# Patient Record
Sex: MALE | Race: WHITE | NOT HISPANIC OR LATINO | Employment: FULL TIME | URBAN - METROPOLITAN AREA
[De-identification: names, ages, dates, MRNs, and addresses within clinical notes are randomized per-mention and may not be internally consistent; named-entity substitution may affect disease eponyms.]

---

## 2017-02-17 ENCOUNTER — HOSPITAL ENCOUNTER (OUTPATIENT)
Dept: CARDIOLOGY | Facility: HOSPITAL | Age: 62
Discharge: HOME | End: 2017-02-17
Attending: SPECIALIST | Admitting: SPECIALIST

## 2017-02-17 ENCOUNTER — TRANSCRIBE ORDERS (OUTPATIENT)
Dept: LAB | Facility: HOSPITAL | Age: 62
End: 2017-02-17

## 2017-02-17 ENCOUNTER — HOSPITAL ENCOUNTER (OUTPATIENT)
Dept: GENERAL RADIOLOGY | Facility: HOSPITAL | Age: 62
Discharge: HOME | End: 2017-02-17
Attending: SPECIALIST

## 2017-02-17 DIAGNOSIS — I10 ESSENTIAL HYPERTENSION: Primary | ICD-10-CM

## 2017-02-17 DIAGNOSIS — R05.9 COUGH: ICD-10-CM

## 2017-02-17 DIAGNOSIS — I10 ESSENTIAL HYPERTENSION: ICD-10-CM

## 2017-02-17 PROCEDURE — 93005 ELECTROCARDIOGRAM TRACING: CPT | Performed by: SPECIALIST

## 2017-02-17 PROCEDURE — 71020 HC CHEST PA AND LATERAL: CPT

## 2017-02-17 PROCEDURE — 93010 ELECTROCARDIOGRAM REPORT: CPT | Performed by: INTERNAL MEDICINE

## 2017-03-06 ENCOUNTER — OFFICE VISIT (OUTPATIENT)
Dept: CARDIOLOGY | Facility: CLINIC | Age: 62
End: 2017-03-06

## 2017-03-06 VITALS
HEART RATE: 76 BPM | SYSTOLIC BLOOD PRESSURE: 170 MMHG | BODY MASS INDEX: 29.62 KG/M2 | WEIGHT: 200 LBS | HEIGHT: 69 IN | DIASTOLIC BLOOD PRESSURE: 82 MMHG

## 2017-03-06 DIAGNOSIS — I73.9 CLAUDICATION (HCC): ICD-10-CM

## 2017-03-06 DIAGNOSIS — I73.9 PAD (PERIPHERAL ARTERY DISEASE) (HCC): Primary | ICD-10-CM

## 2017-03-06 PROCEDURE — 99204 OFFICE O/P NEW MOD 45 MIN: CPT | Performed by: INTERNAL MEDICINE

## 2017-03-06 RX ORDER — AMLODIPINE BESYLATE AND BENAZEPRIL HYDROCHLORIDE 5; 10 MG/1; MG/1
1 CAPSULE ORAL 2 TIMES DAILY
Status: ON HOLD | COMMUNITY
End: 2017-04-25

## 2017-03-06 RX ORDER — GABAPENTIN 300 MG/1
300 CAPSULE ORAL 2 TIMES DAILY
COMMUNITY
End: 2017-05-24 | Stop reason: SDUPTHER

## 2017-03-06 RX ORDER — ROSUVASTATIN CALCIUM 5 MG/1
5 TABLET, COATED ORAL DAILY
COMMUNITY

## 2017-03-06 RX ORDER — PENTOXIFYLLINE 400 MG/1
400 TABLET, EXTENDED RELEASE ORAL
COMMUNITY
End: 2017-03-21

## 2017-03-09 PROCEDURE — 93000 ELECTROCARDIOGRAM COMPLETE: CPT | Performed by: INTERNAL MEDICINE

## 2017-03-09 NOTE — PROGRESS NOTES
Subjective:     Encounter Date:03/06/2017      Patient ID: Loyd Vasquez is a 61 y.o. male.    Chief Complaint: PAD, claudication    History of Present Illness     Dear Dr. Jimenez,     I had the pleasure of seeing your patient Loyd Vasquez in cardiac evaluation today.  As you well know, he is a jw 61-year-old man with history of smoking.  He has hypertension and treated hyperlipidemia.  He comes to me with complaints of exertional claudication.  He says that when he walks, he gets pain in his left calf.  It is lifestyle limiting now and he is interested in improving his circulation.  He has cool lower extremities bilaterally.      His EKG demonstrates an incomplete left bundle branch block but he has no complaints of angina.  He denies any symptoms of critical limb ischemia including ulceration or infection.          Review of Systems   All other systems reviewed and are negative.    History reviewed. No pertinent family history.    Social History   Substance Use Topics   • Smoking status: Current Every Day Smoker     Packs/day: 1.00     Years: 40.00   • Smokeless tobacco: None   • Alcohol use No         ECG 12 Lead  Date/Time: 3/9/2017 11:58 AM  Performed by: BELTRAN SHAHID  Authorized by: BELTRAN SHAHID   Comparison: compared with previous ECG   Similar to previous ECG  Rhythm: sinus rhythm  BPM: 76  Conduction: incomplete LBBB               Objective:     Physical Exam   Constitutional: He is oriented to person, place, and time. He appears well-developed and well-nourished.   HENT:   Head: Normocephalic and atraumatic.   Neck: Normal range of motion. Neck supple.   Cardiovascular: Normal rate, regular rhythm and normal heart sounds.    Reduced pedal pulses bilaterally with cool feet   Pulmonary/Chest: Effort normal and breath sounds normal.   Abdominal: Soft. Bowel sounds are normal.   Musculoskeletal: Normal range of motion.   Neurological: He is alert and oriented to person, place, and time.   Skin: Skin is warm  and dry.   Psychiatric: He has a normal mood and affect. His behavior is normal. Thought content normal.   Vitals reviewed.      Lab Review:       Assessment:          Diagnosis Plan   1. PAD (peripheral artery disease)  Case Request Cath Lab: Abdominal Aortagram with Runoff - L LE claudication   2. Claudication  Case Request Cath Lab: Abdominal Aortagram with Runoff - L LE claudication          Plan:        It was a pleasure to see your patient in cardiac evaluation today.  He is a jw 61-year-old man with multiple risk factors for coronary and peripheral arterial disease.  He denies any symptoms of angina although he has symptoms of severe lifestyle limiting claudication.  Peripheral examination suggests significant peripheral arterial disease, likely SFA in location.  He would like to have this treated.  I recommended that we proceed with lower extremity angiography and possible percutaneous intervention of his left leg.  He will schedule this at his earliest convenience.

## 2017-03-13 ENCOUNTER — LAB (OUTPATIENT)
Dept: LAB | Facility: HOSPITAL | Age: 62
End: 2017-03-13
Attending: INTERNAL MEDICINE

## 2017-03-13 ENCOUNTER — TRANSCRIBE ORDERS (OUTPATIENT)
Dept: CARDIOLOGY | Facility: CLINIC | Age: 62
End: 2017-03-13

## 2017-03-13 DIAGNOSIS — Z13.6 SCREENING FOR ISCHEMIC HEART DISEASE: ICD-10-CM

## 2017-03-13 DIAGNOSIS — Z01.810 PRE-OPERATIVE CARDIOVASCULAR EXAMINATION: Primary | ICD-10-CM

## 2017-03-13 DIAGNOSIS — Z01.810 PRE-OPERATIVE CARDIOVASCULAR EXAMINATION: ICD-10-CM

## 2017-03-13 LAB
ANION GAP SERPL CALCULATED.3IONS-SCNC: 12.9 MMOL/L
BASOPHILS # BLD AUTO: 0.02 10*3/MM3 (ref 0–0.2)
BASOPHILS NFR BLD AUTO: 0.3 % (ref 0–1.5)
BUN BLD-MCNC: 17 MG/DL (ref 6–20)
BUN/CREAT SERPL: 14 (ref 7–25)
CALCIUM SPEC-SCNC: 9.4 MG/DL (ref 8.6–10.5)
CHLORIDE SERPL-SCNC: 102 MMOL/L (ref 98–107)
CO2 SERPL-SCNC: 25.1 MMOL/L (ref 22–29)
CREAT BLD-MCNC: 1.21 MG/DL (ref 0.76–1.27)
DEPRECATED RDW RBC AUTO: 41 FL (ref 37–54)
EOSINOPHIL # BLD AUTO: 0.11 10*3/MM3 (ref 0–0.7)
EOSINOPHIL NFR BLD AUTO: 1.4 % (ref 0.3–6.2)
ERYTHROCYTE [DISTWIDTH] IN BLOOD BY AUTOMATED COUNT: 12.7 % (ref 11.5–14.5)
GFR SERPL CREATININE-BSD FRML MDRD: 61 ML/MIN/1.73
GLUCOSE BLD-MCNC: 111 MG/DL (ref 65–99)
HCT VFR BLD AUTO: 40.9 % (ref 40.4–52.2)
HGB BLD-MCNC: 14.2 G/DL (ref 13.7–17.6)
IMM GRANULOCYTES # BLD: 0 10*3/MM3 (ref 0–0.03)
IMM GRANULOCYTES NFR BLD: 0 % (ref 0–0.5)
LYMPHOCYTES # BLD AUTO: 3.35 10*3/MM3 (ref 0.9–4.8)
LYMPHOCYTES NFR BLD AUTO: 44.1 % (ref 19.6–45.3)
MCH RBC QN AUTO: 31.1 PG (ref 27–32.7)
MCHC RBC AUTO-ENTMCNC: 34.7 G/DL (ref 32.6–36.4)
MCV RBC AUTO: 89.7 FL (ref 79.8–96.2)
MONOCYTES # BLD AUTO: 0.69 10*3/MM3 (ref 0.2–1.2)
MONOCYTES NFR BLD AUTO: 9.1 % (ref 5–12)
NEUTROPHILS # BLD AUTO: 3.42 10*3/MM3 (ref 1.9–8.1)
NEUTROPHILS NFR BLD AUTO: 45.1 % (ref 42.7–76)
PLATELET # BLD AUTO: 220 10*3/MM3 (ref 140–500)
PMV BLD AUTO: 9.7 FL (ref 6–12)
POTASSIUM BLD-SCNC: 4.2 MMOL/L (ref 3.5–5.2)
RBC # BLD AUTO: 4.56 10*6/MM3 (ref 4.6–6)
SODIUM BLD-SCNC: 140 MMOL/L (ref 136–145)
WBC NRBC COR # BLD: 7.59 10*3/MM3 (ref 4.5–10.7)

## 2017-03-14 ENCOUNTER — HOSPITAL ENCOUNTER (OUTPATIENT)
Facility: HOSPITAL | Age: 62
Setting detail: HOSPITAL OUTPATIENT SURGERY
Discharge: HOME | End: 2017-03-14
Attending: INTERNAL MEDICINE | Admitting: INTERNAL MEDICINE

## 2017-03-14 VITALS
WEIGHT: 200 LBS | RESPIRATION RATE: 16 BRPM | HEART RATE: 82 BPM | HEIGHT: 69 IN | BODY MASS INDEX: 29.62 KG/M2 | SYSTOLIC BLOOD PRESSURE: 146 MMHG | OXYGEN SATURATION: 96 % | DIASTOLIC BLOOD PRESSURE: 79 MMHG | TEMPERATURE: 97.6 F

## 2017-03-14 DIAGNOSIS — I73.9 PAD (PERIPHERAL ARTERY DISEASE) (HCC): ICD-10-CM

## 2017-03-14 DIAGNOSIS — I73.9 CLAUDICATION (HCC): ICD-10-CM

## 2017-03-14 PROCEDURE — 25010000002 FENTANYL CITRATE (PF) 100 MCG/2ML SOLUTION: Performed by: INTERNAL MEDICINE

## 2017-03-14 PROCEDURE — C1894 INTRO/SHEATH, NON-LASER: HCPCS | Performed by: INTERNAL MEDICINE

## 2017-03-14 PROCEDURE — 99152 MOD SED SAME PHYS/QHP 5/>YRS: CPT | Performed by: INTERNAL MEDICINE

## 2017-03-14 PROCEDURE — C1769 GUIDE WIRE: HCPCS | Performed by: INTERNAL MEDICINE

## 2017-03-14 PROCEDURE — C1887 CATHETER, GUIDING: HCPCS | Performed by: INTERNAL MEDICINE

## 2017-03-14 PROCEDURE — 75625 CONTRAST EXAM ABDOMINL AORTA: CPT | Performed by: INTERNAL MEDICINE

## 2017-03-14 PROCEDURE — 37226 PR REVSC OPN/PRQ FEM/POP W/STNT/ANGIOP SM VSL: CPT | Performed by: INTERNAL MEDICINE

## 2017-03-14 PROCEDURE — 25010000002 MIDAZOLAM PER 1 MG: Performed by: INTERNAL MEDICINE

## 2017-03-14 PROCEDURE — 25010000002 HEPARIN (PORCINE) PER 1000 UNITS: Performed by: INTERNAL MEDICINE

## 2017-03-14 PROCEDURE — 37221 PR REVSC OPN/PRQ ILIAC ART W/STNT PLMT & ANGIOPLSTY: CPT | Performed by: INTERNAL MEDICINE

## 2017-03-14 PROCEDURE — 85347 COAGULATION TIME ACTIVATED: CPT

## 2017-03-14 PROCEDURE — C1725 CATH, TRANSLUMIN NON-LASER: HCPCS | Performed by: INTERNAL MEDICINE

## 2017-03-14 PROCEDURE — 99153 MOD SED SAME PHYS/QHP EA: CPT | Performed by: INTERNAL MEDICINE

## 2017-03-14 PROCEDURE — C1876 STENT, NON-COA/NON-COV W/DEL: HCPCS | Performed by: INTERNAL MEDICINE

## 2017-03-14 PROCEDURE — 75716 ARTERY X-RAYS ARMS/LEGS: CPT | Performed by: INTERNAL MEDICINE

## 2017-03-14 PROCEDURE — 0 IOPAMIDOL PER 1 ML: Performed by: INTERNAL MEDICINE

## 2017-03-14 DEVICE — IMPLANTABLE DEVICE: Type: IMPLANTABLE DEVICE | Status: FUNCTIONAL

## 2017-03-14 DEVICE — IMPLANTABLE DEVICE
Type: IMPLANTABLE DEVICE | Status: FUNCTIONAL
Brand: CORDIS S.M.A.R.T. CONTROL VASCULAR STENT SYSTEM

## 2017-03-14 RX ORDER — CLOPIDOGREL BISULFATE 75 MG/1
75 TABLET ORAL DAILY
Status: DISCONTINUED | OUTPATIENT
Start: 2017-03-15 | End: 2017-03-14 | Stop reason: HOSPADM

## 2017-03-14 RX ORDER — FENTANYL CITRATE 50 UG/ML
INJECTION, SOLUTION INTRAMUSCULAR; INTRAVENOUS AS NEEDED
Status: DISCONTINUED | OUTPATIENT
Start: 2017-03-14 | End: 2017-03-14 | Stop reason: HOSPADM

## 2017-03-14 RX ORDER — SODIUM CHLORIDE 0.9 % (FLUSH) 0.9 %
1-10 SYRINGE (ML) INJECTION AS NEEDED
Status: DISCONTINUED | OUTPATIENT
Start: 2017-03-14 | End: 2017-03-14 | Stop reason: HOSPADM

## 2017-03-14 RX ORDER — LIDOCAINE HYDROCHLORIDE 20 MG/ML
INJECTION, SOLUTION INFILTRATION; PERINEURAL AS NEEDED
Status: DISCONTINUED | OUTPATIENT
Start: 2017-03-14 | End: 2017-03-14 | Stop reason: HOSPADM

## 2017-03-14 RX ORDER — CLOPIDOGREL BISULFATE 75 MG/1
75 TABLET ORAL DAILY
Qty: 30 TABLET | Refills: 11 | Status: SHIPPED | OUTPATIENT
Start: 2017-03-14 | End: 2017-03-30 | Stop reason: SDUPTHER

## 2017-03-14 RX ORDER — MIDAZOLAM HYDROCHLORIDE 1 MG/ML
INJECTION INTRAMUSCULAR; INTRAVENOUS AS NEEDED
Status: DISCONTINUED | OUTPATIENT
Start: 2017-03-14 | End: 2017-03-14 | Stop reason: HOSPADM

## 2017-03-14 RX ORDER — SODIUM CHLORIDE 9 MG/ML
100 INJECTION, SOLUTION INTRAVENOUS CONTINUOUS
Status: DISCONTINUED | OUTPATIENT
Start: 2017-03-14 | End: 2017-03-14 | Stop reason: HOSPADM

## 2017-03-14 RX ORDER — NITROGLYCERIN 5 MG/ML
INJECTION, SOLUTION INTRAVENOUS AS NEEDED
Status: DISCONTINUED | OUTPATIENT
Start: 2017-03-14 | End: 2017-03-14 | Stop reason: HOSPADM

## 2017-03-14 RX ORDER — LIDOCAINE HYDROCHLORIDE 10 MG/ML
0.1 INJECTION, SOLUTION EPIDURAL; INFILTRATION; INTRACAUDAL; PERINEURAL ONCE AS NEEDED
Status: DISCONTINUED | OUTPATIENT
Start: 2017-03-14 | End: 2017-03-14 | Stop reason: HOSPADM

## 2017-03-14 RX ORDER — HEPARIN SODIUM 1000 [USP'U]/ML
INJECTION, SOLUTION INTRAVENOUS; SUBCUTANEOUS AS NEEDED
Status: DISCONTINUED | OUTPATIENT
Start: 2017-03-14 | End: 2017-03-14 | Stop reason: HOSPADM

## 2017-03-14 RX ORDER — SODIUM CHLORIDE 9 MG/ML
75 INJECTION, SOLUTION INTRAVENOUS CONTINUOUS
Status: DISCONTINUED | OUTPATIENT
Start: 2017-03-14 | End: 2017-03-14 | Stop reason: HOSPADM

## 2017-03-14 RX ORDER — CLOPIDOGREL BISULFATE 75 MG/1
300 TABLET ORAL ONCE
Status: COMPLETED | OUTPATIENT
Start: 2017-03-14 | End: 2017-03-14

## 2017-03-14 RX ORDER — ASPIRIN 325 MG
325 TABLET, DELAYED RELEASE (ENTERIC COATED) ORAL DAILY
Status: DISCONTINUED | OUTPATIENT
Start: 2017-03-14 | End: 2017-03-14 | Stop reason: HOSPADM

## 2017-03-14 RX ADMIN — CLOPIDOGREL 300 MG: 75 TABLET, FILM COATED ORAL at 15:24

## 2017-03-14 RX ADMIN — SODIUM CHLORIDE 75 ML/HR: 9 INJECTION, SOLUTION INTRAVENOUS at 10:01

## 2017-03-14 RX ADMIN — ASPIRIN 325 MG: 325 TABLET, COATED ORAL at 15:39

## 2017-03-14 NOTE — INTERVAL H&P NOTE
H&P reviewed. The patient was examined and there are no changes to the H&P.     PAD, LLE claudication.  Here for AIF.

## 2017-03-14 NOTE — DISCHARGE INSTRUCTIONS
[facility]  [address] [place] [address]     Angiogram with Stent (Femoral Approach) After Care    Refer to this sheet in the next few weeks. These instructions provide you with information on caring for yourself after your procedure. Your health care provider may also give you more specific instructions. Your treatment has been planned according to current medical practices, but problems sometimes occur. Call your health care provider if you have any problems or questions after your procedure.    WHAT TO EXPECT AFTER THE PROCEDURE    • The insertion site may be tender for a few days after your procedure.  • Minor discomfort or tenderness and a small bump at the catheter insertion site.  The bump should decrease in size and tenderness within 1 to 2 weeks.   HOME CARE INSTRUCTIONS    · Take medicines only as directed by your health care provider. Blood thinners may be prescribed after your procedure to improve blood flow through the stent.  · Cardiac Rehab may or may not be ordered.  Please consult with your physician.   · Do not apply powder or lotion to the site.    · Check your insertion site every day for redness, swelling, or fluid leaking from the insertion site.    · Increase your fluid intake for the next 2 days.    · Hold direct pressure over the site when you cough, sneeze, laugh or change positions.  Do this for the next 2 days.    · Avoid strenuous activity as directed by your physician.  · Follow instructions about when you can drive and return to work as directed by your physician.     · Do not take baths, swim, or use a hot tub until your health care provider approves.   · You may shower 24 hours after the procedure. Remove the bandage (dressing) and gently wash the site with plain soap and water.  Gently pat the site dry.  Do not rub the insertion area with a washcloth or towel.  You may apply a band aid daily for 2 days if desired.   · Limit your activity for the first 48  hours.  Do not bend, squat, or lift anything over 20lb. (9 kg) or as directed by your health care provider.  However, we recommend lifting nothing heavier than a gallon of milk.     · Eat a heart-healthy diet. This should include plenty of fresh fruits and vegetables. Meat should be lean cuts. Avoid the following types of food:    ¨ Food that is high in salt.    ¨ Canned or highly processed food.    ¨ Food that is high in saturated fat or sugar.    ¨ Fried food.    · Make any other lifestyle changes recommended by your health care provider. This may include:    ¨ Not using any tobacco products including cigarettes, chewing tobacco, or electronic cigarettes.   ¨ Managing your weight.    ¨ Getting regular exercise.    ¨ Managing your blood pressure.    ¨ Limiting your alcohol intake.    ¨ Managing other health problems, such as diabetes.    · If you need an MRI after your heart stent was placed, be sure to tell the health care provider who orders the MRI that you have a heart stent.    · Keep all follow-up visits as directed by your health care provider.    SEEK IMMEDIATE MEDICAL CARE IF:    · You have heavy bleeding from the site.  If this happens, hold pressure on the site and call 911.    · You develop chest pain, shortness of breath, feel faint, or pass out.  · You have bleeding, swelling larger than a walnut, or drainage from the catheter insertion site.  · You develop pain, discoloration, coldness, numbness, tingling, or severe bruising in the leg that held the catheter.  · You develop bleeding from any other place such as from the bowels.   · You have a fever > 101 or chills.  MAKE SURE YOU:  · Understand these instructions.  · Will watch your condition.  · Will get help right away if you are not doing well or get worse    Outpatient Surgery Guidelines, Adult  Outpatient procedures are those for which the person having the procedure is allowed to go home the same day as the procedure. Various procedures are  done on an outpatient basis. You should follow some general guidelines if you will be having an outpatient procedure.  AFTER THE  PROCEDURE  After surgery, you will be taken to a recovery area, where your progress will be monitored. If there are no complications, you will be allowed to go home when you are awake, stable, and taking fluids well. You may have numbness around the surgical site. Healing will take some time. You will have tenderness at the surgical site and may have some swelling and bruising. You may also have some nausea.  HOME CARE INSTRUCTIONS  · Do not drive for 24 hours, or as directed by your health care provider. Do not drive while taking prescription pain medicines.  · Do not drink alcohol for 24 hours.  · Do not make important decisions or sign legal documents for 24 hours.  · Plan on having a responsible adult stay with your for 24 hours following your procedure.  · You may resume a normal diet and activities as directed.  · Only take over-the-counter or prescription medicines as directed by your health care provider.  · Follow up with your health care provider as directed.  SEEK MEDICAL CARE IF:  · You have increased bleeding (more than a small spot) from the surgical site.  · You have redness, swelling, or increasing pain in the wound.  · You see pus coming from the wound.  · You have a fever > 101.  · You notice a bad smell coming from the wound or dressing.  · You feel lightheaded or faint.  · You develop a rash.  · You have trouble breathing.  · You develop allergies.  MAKE SURE YOU:  · Understand these instructions.  · Will watch your condition.  Will get help right away if you are not doing well or get worse..

## 2017-03-14 NOTE — PERIOPERATIVE NURSING NOTE
EVERFLEX STENT  CARD AND  CORDIS STENT CARDS GIVEN TO SPOUCE WITH INSTRUCTIONS TO CARRY WITH PT. AT ALL TIMES AND SHOW TO ANY MEDICAL PERSONNEL WHO MAY BE TREATING  YOU.

## 2017-03-14 NOTE — PLAN OF CARE
Problem: Patient Care Overview (Adult)  Goal: Plan of Care Review  Outcome: Outcome(s) achieved Date Met:  03/14/17 03/14/17 8931   Coping/Psychosocial Response Interventions   Plan Of Care Reviewed With patient;family   Patient Care Overview   Progress improving   Outcome Evaluation   Outcome Summary/Follow up Plan ready for dc       Goal: Adult Individualization and Mutuality  Outcome: Outcome(s) achieved Date Met:  03/14/17  Goal: Discharge Needs Assessment  Outcome: Outcome(s) achieved Date Met:  03/14/17    Problem: Cardiac Catheterization with/without PCI (Adult)  Goal: Signs and Symptoms of Listed Potential Problems Will be Absent or Manageable (Cardiac Catheterization with/without PCI)  Outcome: Outcome(s) achieved Date Met:  03/14/17

## 2017-03-15 ENCOUNTER — TELEPHONE (OUTPATIENT)
Dept: CARDIOLOGY | Facility: CLINIC | Age: 62
End: 2017-03-15

## 2017-03-15 LAB
ACT BLD: 188 SECONDS (ref 82–152)
ACT BLD: 229 SECONDS (ref 82–152)
ACT BLD: 250 SECONDS (ref 82–152)

## 2017-03-15 NOTE — TELEPHONE ENCOUNTER
PT daughter(Shelley) called in this morning and would like to know when the PT is to follow up from the surgery. They are unaware if he needs to come in sooner then 4wks to discuss the PT other leg.    Manny

## 2017-03-15 NOTE — TELEPHONE ENCOUNTER
03/15/17  3:53 PM  Mr. Vasquez's daughter calls; he is experiencing pain in his left leg. This leg is pink and warm. There is no swelling, bleeding, bruising, numbness or tingling to the left leg. Per Dr. Roland, this is not unexpected as this patient had a great deal of blockage in that leg.     I scheduled him to see Marta on 3/21 at  0920 and to see Dr. Roland on 4/19 at 0915.     Shelley also wants to know if patient is to remain off work until he see Marta?    Karlie RODRIGUEZ RN

## 2017-03-21 ENCOUNTER — OFFICE VISIT (OUTPATIENT)
Dept: CARDIOLOGY | Facility: CLINIC | Age: 62
End: 2017-03-21

## 2017-03-21 VITALS
HEART RATE: 78 BPM | BODY MASS INDEX: 29.62 KG/M2 | WEIGHT: 200 LBS | HEIGHT: 69 IN | OXYGEN SATURATION: 96 % | SYSTOLIC BLOOD PRESSURE: 158 MMHG | DIASTOLIC BLOOD PRESSURE: 90 MMHG

## 2017-03-21 DIAGNOSIS — I73.9 PAD (PERIPHERAL ARTERY DISEASE) (HCC): ICD-10-CM

## 2017-03-21 DIAGNOSIS — I10 ESSENTIAL HYPERTENSION: Primary | ICD-10-CM

## 2017-03-21 DIAGNOSIS — Z72.0 TOBACCO ABUSE: ICD-10-CM

## 2017-03-21 DIAGNOSIS — Z98.62 S/P PERIPHERAL ARTERY ANGIOPLASTY: ICD-10-CM

## 2017-03-21 PROCEDURE — 93000 ELECTROCARDIOGRAM COMPLETE: CPT | Performed by: PHYSICIAN ASSISTANT

## 2017-03-21 PROCEDURE — 99214 OFFICE O/P EST MOD 30 MIN: CPT | Performed by: PHYSICIAN ASSISTANT

## 2017-03-21 NOTE — PROGRESS NOTES
Date of Office Visit: 2017  Encounter Provider: ZAHRA Dowell  Place of Service: [facility] CARDIOLOGY  Patient Name: Loyd Vasquez  :1955    Chief Complaint   Patient presents with   • Claudication     1 week hospital follow up   :     HPI: Loyd Vasquez is a 61 y.o. male , new to me, who presents today for follow-up.  Old records a been obtained and reviewed by me.  He is a patient with a past medical history significant for tobacco abuse, hypertension, and hyperlipidemia.  He was first evaluated by Dr. Roland on 3/6/2017 for complaints of claudication with exertion.  At that visit, Dr. Roland noted that his EKG demonstrated an incomplete left bundle branch block but that the patient had no complaints of angina.  Because of his complaints, he was referred for abdominal aortogram on 3/15/2017.  This revealed an 80% focal stenosis in the right common iliac artery, 40% left common iliac artery stenosis, severe diffuse disease of the right internal iliac, total occlusion of the left internal iliac, 90% stenosis of the left external iliac, 40% stenosis of the right external iliac, totally occluded right SFA at the ostium, and 70% ostial stenosis of the left profunda.  He underwent successful PTA and stenting of the left external iliac artery and successful PTA stenting from the distal left common femoral down to the distal SFA.  Recommendations were for dual antiplatelet therapy for at least 1 year and consideration of returning for right SFA intervention in the future if the patient was symptomatic.   Since he's been home, he's been doing well.  She denies any chest pain, shortness of breath, palpitations, dizziness, or syncope.  He does have some swelling and redness in his left foot, as well as a little bit of pain.  He states that the pain is different than his claudication pain.  He has not had anymore claudication pain.  He works as a  and feels like he is  ready to go back to work.  Urgently, he has been tobacco free for 1 week.    Past Medical History   Diagnosis Date   • Chronic kidney disease      KIDNEY STONE   • Claudication    • Hypertension    • PAD (peripheral artery disease)        Past Surgical History   Procedure Laterality Date   • Interventional radiology procedure N/A 3/14/2017     Procedure: Abdominal Aortagram with Runoff - L LE claudication;  Surgeon: Andrade Roland MD;  Location: [facility] INVASIVE LOCATION;  Service:    • Cardiac catheterization N/A 3/14/2017     Procedure: Stent BMS peripheral;  Surgeon: Andrade Roland MD;  Location: [facility] CATH INVASIVE LOCATION;  Service:        Social History     Social History   • Marital status:      Spouse name: N/A   • Number of children: N/A   • Years of education: N/A     Occupational History   • Not on file.     Social History Main Topics   • Smoking status: Never Smoker   • Smokeless tobacco: Not on file   • Alcohol use No   • Drug use: No   • Sexual activity: Defer     Other Topics Concern   • Not on file     Social History Narrative       Family History   Problem Relation Age of Onset   • No Known Problems Mother    • No Known Problems Father    • No Known Problems Maternal Grandmother    • No Known Problems Maternal Grandfather    • No Known Problems Paternal Grandmother    • No Known Problems Paternal Grandfather        Review of Systems   Constitution: Negative for chills, fever, malaise/fatigue, weight gain and weight loss.   HENT: Negative for ear pain, headaches, hearing loss, nosebleeds and sore throat.    Eyes: Negative for double vision, pain and visual disturbance.   Cardiovascular: Positive for leg swelling. Negative for chest pain, dyspnea on exertion, irregular heartbeat, near-syncope, orthopnea, palpitations, paroxysmal nocturnal dyspnea and syncope.   Respiratory: Negative for cough, shortness of breath, sleep disturbances due to breathing, snoring and wheezing.    Endocrine: Negative  "for cold intolerance, heat intolerance and polyuria.   Skin: Negative for itching and rash.   Musculoskeletal: Negative for joint pain, joint swelling and myalgias.   Gastrointestinal: Negative for abdominal pain, diarrhea, melena, nausea and vomiting.   Genitourinary: Negative for frequency, hematuria and hesitancy.   Neurological: Negative for excessive daytime sleepiness, light-headedness, numbness, paresthesias and seizures.   Psychiatric/Behavioral: Negative for altered mental status and depression.   Allergic/Immunologic: Negative.    All other systems reviewed and are negative.      No Known Allergies      Current Outpatient Prescriptions:   •  amLODIPine-benazepril (LOTREL 5-10) 5-10 MG per capsule, Take 1 capsule by mouth 2 (Two) Times a Day., Disp: , Rfl:   •  aspirin 325 MG EC tablet, Take 1 tablet by mouth Daily., Disp: 30 tablet, Rfl: 11  •  Cholecalciferol (VITAMIN D3) 5000 UNITS capsule capsule, Take 5,000 Units by mouth Daily., Disp: , Rfl:   •  clopidogrel (PLAVIX) 75 MG tablet, Take 1 tablet by mouth Daily., Disp: 30 tablet, Rfl: 11  •  gabapentin (NEURONTIN) 300 MG capsule, Take 300 mg by mouth Every Night., Disp: , Rfl:   •  rosuvastatin (CRESTOR) 5 MG tablet, Take 5 mg by mouth Daily., Disp: , Rfl:      Objective:     Vitals:    03/21/17 0913   Weight: 200 lb (90.7 kg)   Height: 69\" (175.3 cm)     Body mass index is 29.53 kg/(m^2).    PHYSICAL EXAM:    Physical Exam   Constitutional: He is oriented to person, place, and time. He appears well-developed and well-nourished. No distress.   HENT:   Head: Normocephalic and atraumatic.   Eyes: Pupils are equal, round, and reactive to light.   Neck: No JVD present. No thyromegaly present.   Cardiovascular: Normal rate, regular rhythm, normal heart sounds and intact distal pulses.    No murmur heard.  Right femoral catheter site well healed without erythema or edema.  Distal and proximal pulses palpable, no bruit auscultated.  Left dorsalis pedis and " posterior tibialis pulses auscultated with Doppler.  Left foot warm and mildly erythematous with mild edema.   Pulmonary/Chest: Effort normal and breath sounds normal. No respiratory distress.   Abdominal: Soft. Bowel sounds are normal. He exhibits no distension. There is no splenomegaly or hepatomegaly. There is no tenderness.   Musculoskeletal: Normal range of motion. He exhibits no edema.   Neurological: He is alert and oriented to person, place, and time.   Skin: Skin is warm and dry. He is not diaphoretic. No erythema.   Psychiatric: He has a normal mood and affect. His behavior is normal. Judgment normal.         ECG 12 Lead  Date/Time: 3/21/2017 9:30 AM  Performed by: TAYO WOOD.  Authorized by: TAYO WOOD.   Comparison: compared with previous ECG from 2/17/2017  Similar to previous ECG  Rhythm: sinus rhythm  BPM: 78  Conduction: incomplete LBBB  Q waves: II, III and aVF  Clinical impression: abnormal ECG  Comments: Indication: Hypertension, peripheral arterial disease.              Assessment:       Diagnosis Plan   1. Essential hypertension     2. PAD (peripheral artery disease)     3. Tobacco abuse     4. S/P peripheral artery angioplasty       No orders of the defined types were placed in this encounter.         Plan:       1.  Hypertension.  His blood pressure is a little elevated today at 142/88.  His primary care physician does manage this for him.  I'm not going to make any changes to his medications today, and I have encouraged him to follow up with his PCP.    2.  Peripheral arterial disease, status post extensive angioplasty and stent placement to the left leg.  Overall he's doing better.  He doesn't have any more claudication in his left leg.  He does have some erythema, edema, and a little bit of pain in his left foot.  I do think that this is relatively normal for his recent revascularization.  I've encouraged him to elevate his foot during the day if the swelling becomes worse.  I do  think that over time this will heal nicely.  He has great pulses on Doppler.  He is not having any claudication symptoms in his right leg.  Certainly when he becomes more active, he may develop claudication in his right leg.  At that time, we can reconsider taking him back to intervene upon the right leg.  Currently, he was pain-free.    3.  Tobacco abuse.  Fortunately he quit smoking.  I've encouraged him to keep the good work and to never start smoking again.  He indicated that he understood.    He will follow-up with Dr. Vu on 4/14/2017.  I've asked him to call us if the pain in his left leg becomes worse.    As always, it has been a pleasure to participate in your patient's care.      Sincerely,         Marta Oneil PA-C

## 2017-03-30 RX ORDER — CLOPIDOGREL BISULFATE 75 MG/1
75 TABLET ORAL DAILY
Qty: 90 TABLET | Refills: 3 | Status: SHIPPED | OUTPATIENT
Start: 2017-03-30 | End: 2017-04-24

## 2017-04-19 ENCOUNTER — LAB (OUTPATIENT)
Dept: LAB | Facility: HOSPITAL | Age: 62
End: 2017-04-19
Attending: INTERNAL MEDICINE

## 2017-04-19 ENCOUNTER — TRANSCRIBE ORDERS (OUTPATIENT)
Dept: ADMINISTRATIVE | Facility: HOSPITAL | Age: 62
End: 2017-04-19

## 2017-04-19 ENCOUNTER — OFFICE VISIT (OUTPATIENT)
Dept: CARDIOLOGY | Facility: CLINIC | Age: 62
End: 2017-04-19

## 2017-04-19 VITALS
HEIGHT: 69 IN | HEART RATE: 70 BPM | DIASTOLIC BLOOD PRESSURE: 94 MMHG | SYSTOLIC BLOOD PRESSURE: 172 MMHG | WEIGHT: 208 LBS | BODY MASS INDEX: 30.81 KG/M2

## 2017-04-19 DIAGNOSIS — Z13.6 SCREENING FOR ISCHEMIC HEART DISEASE: ICD-10-CM

## 2017-04-19 DIAGNOSIS — Z01.810 PRE-OPERATIVE CARDIOVASCULAR EXAMINATION: ICD-10-CM

## 2017-04-19 DIAGNOSIS — Z01.810 PRE-OPERATIVE CARDIOVASCULAR EXAMINATION: Primary | ICD-10-CM

## 2017-04-19 DIAGNOSIS — I73.9 PAD (PERIPHERAL ARTERY DISEASE) (HCC): Primary | ICD-10-CM

## 2017-04-19 LAB
ANION GAP SERPL CALCULATED.3IONS-SCNC: 12.9 MMOL/L
BASOPHILS # BLD AUTO: 0.02 10*3/MM3 (ref 0–0.2)
BASOPHILS NFR BLD AUTO: 0.3 % (ref 0–1.5)
BUN BLD-MCNC: 22 MG/DL (ref 6–20)
BUN/CREAT SERPL: 16.2 (ref 7–25)
CALCIUM SPEC-SCNC: 9.3 MG/DL (ref 8.6–10.5)
CHLORIDE SERPL-SCNC: 102 MMOL/L (ref 98–107)
CO2 SERPL-SCNC: 25.1 MMOL/L (ref 22–29)
CREAT BLD-MCNC: 1.36 MG/DL (ref 0.76–1.27)
DEPRECATED RDW RBC AUTO: 41.5 FL (ref 37–54)
EOSINOPHIL # BLD AUTO: 0.14 10*3/MM3 (ref 0–0.7)
EOSINOPHIL NFR BLD AUTO: 2 % (ref 0.3–6.2)
ERYTHROCYTE [DISTWIDTH] IN BLOOD BY AUTOMATED COUNT: 12.9 % (ref 11.5–14.5)
GFR SERPL CREATININE-BSD FRML MDRD: 54 ML/MIN/1.73
GLUCOSE BLD-MCNC: 116 MG/DL (ref 65–99)
HCT VFR BLD AUTO: 39.2 % (ref 40.4–52.2)
HGB BLD-MCNC: 13.5 G/DL (ref 13.7–17.6)
IMM GRANULOCYTES # BLD: 0.02 10*3/MM3 (ref 0–0.03)
IMM GRANULOCYTES NFR BLD: 0.3 % (ref 0–0.5)
LYMPHOCYTES # BLD AUTO: 2.97 10*3/MM3 (ref 0.9–4.8)
LYMPHOCYTES NFR BLD AUTO: 42.2 % (ref 19.6–45.3)
MCH RBC QN AUTO: 30.3 PG (ref 27–32.7)
MCHC RBC AUTO-ENTMCNC: 34.4 G/DL (ref 32.6–36.4)
MCV RBC AUTO: 88.1 FL (ref 79.8–96.2)
MONOCYTES # BLD AUTO: 0.68 10*3/MM3 (ref 0.2–1.2)
MONOCYTES NFR BLD AUTO: 9.7 % (ref 5–12)
NEUTROPHILS # BLD AUTO: 3.21 10*3/MM3 (ref 1.9–8.1)
NEUTROPHILS NFR BLD AUTO: 45.5 % (ref 42.7–76)
PLATELET # BLD AUTO: 197 10*3/MM3 (ref 140–500)
PMV BLD AUTO: 9.4 FL (ref 6–12)
POTASSIUM BLD-SCNC: 4.4 MMOL/L (ref 3.5–5.2)
RBC # BLD AUTO: 4.45 10*6/MM3 (ref 4.6–6)
SODIUM BLD-SCNC: 140 MMOL/L (ref 136–145)
WBC NRBC COR # BLD: 7.04 10*3/MM3 (ref 4.5–10.7)

## 2017-04-19 PROCEDURE — 99213 OFFICE O/P EST LOW 20 MIN: CPT | Performed by: INTERNAL MEDICINE

## 2017-04-19 PROCEDURE — 85025 COMPLETE CBC W/AUTO DIFF WBC: CPT

## 2017-04-19 PROCEDURE — 36415 COLL VENOUS BLD VENIPUNCTURE: CPT

## 2017-04-19 PROCEDURE — 80048 BASIC METABOLIC PNL TOTAL CA: CPT

## 2017-04-19 PROCEDURE — 93000 ELECTROCARDIOGRAM COMPLETE: CPT | Performed by: INTERNAL MEDICINE

## 2017-04-19 NOTE — PROGRESS NOTES
Subjective:     Encounter Date:04/19/2017      Patient ID: Loyd Vasquez is a 61 y.o. male.    Chief Complaint: PAD, claudication    History of Present Illness     Dear Dr. Jimenez:      I had the pleasure of seeing the patient in cardiovascular follow-up today.  As you well know, he is a jw, 61-year-old man with history of severe peripheral arterial disease and claudication.  Because of this, I performed diagnostic lower extremity angiography.  I found him to have very severe multilevel disease.  He had an 80% stenosis of the right common iliac artery.  The left external iliac artery had a 90% stenosis.  The left superficial femoral artery was totally occluded and reconstituted at the mid-vessel.  The right superficial femoral artery was totally occluded at the ostium and reconstituted at mid-vessel.      He underwent implantation of several stents in the left external iliac and superficial femoral artery territories.      After his procedure, he reported swelling, which is expected after restoration of arterial flow in this chronically occluded extremity.  This has since resolved.  He does have some foot pain and some numbness, which I think is related to neuropathy.      He comes in today for his follow-up visit.  His pulse is excellent in the left foot.  The right remains diminished.          Review of Systems   All other systems reviewed and are negative.        ECG 12 Lead  Date/Time: 4/19/2017 9:53 AM  Performed by: BELTRAN SHAHID  Authorized by: BELTRAN SHAHID   Comparison: compared with previous ECG   Similar to previous ECG  Rhythm: sinus rhythm  BPM: 70  Conduction: non-specific intraventricular conduction delay               Objective:     Physical Exam   Constitutional: He is oriented to person, place, and time. He appears well-developed and well-nourished.   HENT:   Head: Normocephalic and atraumatic.   Neck: Normal range of motion. Neck supple.   Cardiovascular: Normal rate, regular rhythm and normal  heart sounds.    R LE pulses diminished.  L foot pulses excellent   Pulmonary/Chest: Effort normal and breath sounds normal.   Abdominal: Soft. Bowel sounds are normal.   Musculoskeletal: Normal range of motion.   Neurological: He is alert and oriented to person, place, and time.   Skin: Skin is warm and dry.   Psychiatric: He has a normal mood and affect. His behavior is normal. Thought content normal.   Vitals reviewed.      Lab Review:       Assessment:          Diagnosis Plan   1. PAD (peripheral artery disease)  Case Request Cath Lab: PTA of right SFA          Plan:        It was a pleasure to see the patient in cardiac follow-up today.  He is doing very well after his complex left lower extremity revascularization.  He has multiple stents and now his circulation is excellent in that leg.  The circulation remains poor in the right.  I talked to him about whether or not he is ready to have his right lower extremity treated.  He says he would like to proceed.  I plan to treat his right iliac lesion as well as his occluded superficial femoral artery.

## 2017-04-24 RX ORDER — ASPIRIN 325 MG
325 TABLET ORAL DAILY
Status: ON HOLD | COMMUNITY
End: 2017-04-25

## 2017-04-24 RX ORDER — CLOPIDOGREL BISULFATE 75 MG/1
75 TABLET ORAL DAILY
COMMUNITY
End: 2018-09-12 | Stop reason: SDUPTHER

## 2017-04-25 ENCOUNTER — HOSPITAL ENCOUNTER (OUTPATIENT)
Facility: HOSPITAL | Age: 62
Setting detail: HOSPITAL OUTPATIENT SURGERY
Discharge: HOME | End: 2017-04-25
Attending: INTERNAL MEDICINE | Admitting: INTERNAL MEDICINE

## 2017-04-25 VITALS
DIASTOLIC BLOOD PRESSURE: 90 MMHG | SYSTOLIC BLOOD PRESSURE: 187 MMHG | RESPIRATION RATE: 16 BRPM | TEMPERATURE: 98.4 F | BODY MASS INDEX: 29.62 KG/M2 | OXYGEN SATURATION: 96 % | HEART RATE: 73 BPM | HEIGHT: 69 IN | WEIGHT: 200 LBS

## 2017-04-25 DIAGNOSIS — I73.9 PAD (PERIPHERAL ARTERY DISEASE) (HCC): ICD-10-CM

## 2017-04-25 LAB
ACT BLD: 188 SECONDS (ref 82–152)
HCT VFR BLDA CALC: 34 % (ref 38–51)
HGB BLDA-MCNC: 11.6 G/DL (ref 12–17)
SAO2 % BLDA: 94 % (ref 95–98)

## 2017-04-25 PROCEDURE — C1769 GUIDE WIRE: HCPCS | Performed by: INTERNAL MEDICINE

## 2017-04-25 PROCEDURE — 99152 MOD SED SAME PHYS/QHP 5/>YRS: CPT | Performed by: INTERNAL MEDICINE

## 2017-04-25 PROCEDURE — 25010000002 MIDAZOLAM PER 1 MG: Performed by: INTERNAL MEDICINE

## 2017-04-25 PROCEDURE — 85347 COAGULATION TIME ACTIVATED: CPT

## 2017-04-25 PROCEDURE — 25010000002 HEPARIN (PORCINE) PER 1000 UNITS: Performed by: INTERNAL MEDICINE

## 2017-04-25 PROCEDURE — 25010000002 FENTANYL CITRATE (PF) 100 MCG/2ML SOLUTION: Performed by: INTERNAL MEDICINE

## 2017-04-25 PROCEDURE — 85014 HEMATOCRIT: CPT

## 2017-04-25 PROCEDURE — C1894 INTRO/SHEATH, NON-LASER: HCPCS | Performed by: INTERNAL MEDICINE

## 2017-04-25 PROCEDURE — 37226 PR REVSC OPN/PRQ FEM/POP W/STNT/ANGIOP SM VSL: CPT | Performed by: INTERNAL MEDICINE

## 2017-04-25 PROCEDURE — C1887 CATHETER, GUIDING: HCPCS | Performed by: INTERNAL MEDICINE

## 2017-04-25 PROCEDURE — C1876 STENT, NON-COA/NON-COV W/DEL: HCPCS | Performed by: INTERNAL MEDICINE

## 2017-04-25 PROCEDURE — C1725 CATH, TRANSLUMIN NON-LASER: HCPCS | Performed by: INTERNAL MEDICINE

## 2017-04-25 PROCEDURE — 85018 HEMOGLOBIN: CPT

## 2017-04-25 PROCEDURE — 0 IOPAMIDOL PER 1 ML: Performed by: INTERNAL MEDICINE

## 2017-04-25 PROCEDURE — 99153 MOD SED SAME PHYS/QHP EA: CPT | Performed by: INTERNAL MEDICINE

## 2017-04-25 DEVICE — IMPLANTABLE DEVICE: Type: IMPLANTABLE DEVICE | Status: FUNCTIONAL

## 2017-04-25 RX ORDER — SODIUM CHLORIDE 9 MG/ML
75 INJECTION, SOLUTION INTRAVENOUS CONTINUOUS
Status: DISCONTINUED | OUTPATIENT
Start: 2017-04-25 | End: 2017-04-25 | Stop reason: HOSPADM

## 2017-04-25 RX ORDER — FENTANYL CITRATE 50 UG/ML
INJECTION, SOLUTION INTRAMUSCULAR; INTRAVENOUS AS NEEDED
Status: DISCONTINUED | OUTPATIENT
Start: 2017-04-25 | End: 2017-04-25 | Stop reason: HOSPADM

## 2017-04-25 RX ORDER — ASPIRIN 325 MG
TABLET ORAL AS NEEDED
Status: DISCONTINUED | OUTPATIENT
Start: 2017-04-25 | End: 2017-04-25 | Stop reason: HOSPADM

## 2017-04-25 RX ORDER — ASPIRIN 81 MG/1
81 TABLET, CHEWABLE ORAL DAILY
Status: DISCONTINUED | OUTPATIENT
Start: 2017-04-25 | End: 2017-04-25 | Stop reason: HOSPADM

## 2017-04-25 RX ORDER — SODIUM CHLORIDE 9 MG/ML
INJECTION, SOLUTION INTRAVENOUS CONTINUOUS PRN
Status: DISCONTINUED | OUTPATIENT
Start: 2017-04-25 | End: 2017-04-25 | Stop reason: HOSPADM

## 2017-04-25 RX ORDER — MIDAZOLAM HYDROCHLORIDE 1 MG/ML
INJECTION INTRAMUSCULAR; INTRAVENOUS AS NEEDED
Status: DISCONTINUED | OUTPATIENT
Start: 2017-04-25 | End: 2017-04-25 | Stop reason: HOSPADM

## 2017-04-25 RX ORDER — SODIUM CHLORIDE 0.9 % (FLUSH) 0.9 %
1-10 SYRINGE (ML) INJECTION AS NEEDED
Status: CANCELLED | OUTPATIENT
Start: 2017-04-25

## 2017-04-25 RX ORDER — LIDOCAINE HYDROCHLORIDE 20 MG/ML
INJECTION, SOLUTION INFILTRATION; PERINEURAL AS NEEDED
Status: DISCONTINUED | OUTPATIENT
Start: 2017-04-25 | End: 2017-04-25 | Stop reason: HOSPADM

## 2017-04-25 RX ORDER — SODIUM CHLORIDE 0.9 % (FLUSH) 0.9 %
1-10 SYRINGE (ML) INJECTION AS NEEDED
Status: DISCONTINUED | OUTPATIENT
Start: 2017-04-25 | End: 2017-04-25 | Stop reason: HOSPADM

## 2017-04-25 RX ORDER — HEPARIN SODIUM 1000 [USP'U]/ML
INJECTION, SOLUTION INTRAVENOUS; SUBCUTANEOUS AS NEEDED
Status: DISCONTINUED | OUTPATIENT
Start: 2017-04-25 | End: 2017-04-25 | Stop reason: HOSPADM

## 2017-04-25 RX ORDER — SODIUM CHLORIDE 9 MG/ML
100 INJECTION, SOLUTION INTRAVENOUS CONTINUOUS
Status: CANCELLED | OUTPATIENT
Start: 2017-04-25

## 2017-04-25 RX ORDER — LIDOCAINE HYDROCHLORIDE 10 MG/ML
0.1 INJECTION, SOLUTION EPIDURAL; INFILTRATION; INTRACAUDAL; PERINEURAL ONCE AS NEEDED
Status: DISCONTINUED | OUTPATIENT
Start: 2017-04-25 | End: 2017-04-25 | Stop reason: HOSPADM

## 2017-04-25 RX ORDER — CLOPIDOGREL BISULFATE 75 MG/1
TABLET ORAL AS NEEDED
Status: DISCONTINUED | OUTPATIENT
Start: 2017-04-25 | End: 2017-04-25 | Stop reason: HOSPADM

## 2017-04-25 RX ORDER — NITROGLYCERIN 5 MG/ML
INJECTION, SOLUTION INTRAVENOUS AS NEEDED
Status: DISCONTINUED | OUTPATIENT
Start: 2017-04-25 | End: 2017-04-25 | Stop reason: HOSPADM

## 2017-04-25 RX ADMIN — SODIUM CHLORIDE 75 ML/HR: 9 INJECTION, SOLUTION INTRAVENOUS at 12:59

## 2017-04-25 NOTE — PERIOPERATIVE NURSING NOTE
Pt. States he use to be on BP med. That his Dr. Did not renew the med. At his last visit. Stressed to pt and spouse that it is important that he see his Dr. And let him know what his BP has been doing and be reevaluated.

## 2017-04-25 NOTE — PLAN OF CARE
Problem: Patient Care Overview (Adult)  Goal: Plan of Care Review  Outcome: Outcome(s) achieved Date Met:  04/25/17 04/25/17 1950   Coping/Psychosocial Response Interventions   Plan Of Care Reviewed With patient;spouse;daughter   Patient Care Overview   Progress improving   Outcome Evaluation   Outcome Summary/Follow up Plan ready for d/c home       Goal: Adult Individualization and Mutuality  Outcome: Outcome(s) achieved Date Met:  04/25/17  Goal: Discharge Needs Assessment  Outcome: Outcome(s) achieved Date Met:  04/25/17    Problem: Perioperative Period (Adult)  Goal: Signs and Symptoms of Listed Potential Problems Will be Absent or Manageable (Perioperative Period)  Outcome: Outcome(s) achieved Date Met:  04/25/17 04/25/17 1950   Perioperative Period   Problems Assessed (Perioperative Period) all   Problems Present (Perioperative Period) none

## 2017-04-25 NOTE — INTERVAL H&P NOTE
H&P reviewed. The patient was examined and there are no changes to the H&P.     Sever multilevel PAD.  S/p L LE revasc last month.  Now here for right SFA and iliac PTA.

## 2017-04-25 NOTE — DISCHARGE INSTRUCTIONS
[facility]  [address] [place] [address]      After Care Angiogram (Femoral Approach) After Care     Refer to this sheet in the next few weeks. These instructions provide you with information on caring for yourself after your procedure. Your health care provider may also give you more specific instructions. Your treatment has been planned according to current medical practices, but problems sometimes occur. Call your health care provider if you have any problems or questions after your procedure.      What to Expect After the Procedure:  After your procedure, it is typical to have the following sensations:  · Minor discomfort or tenderness and a small bump at the catheter insertion site. The bump should usually decrease in size and tenderness within 1 to 2 weeks.  · Any bruising will usually fade within 2 to 4 weeks.  Home Care Instructions:  · Do not apply powder or lotion to the site.  · Do not take baths, swim, or use a hot tub until your health care provider approves and the site is completely healed.  · Do not bend, squat, or lift anything over 20 lb (9 kg) or as directed by your health care provider. However, we recommend lifting nothing heavier than a gallon of milk.    · You may shower 24 hours after the procedure. Remove the bandage (dressing) and gently wash the site with plain soap and water. Gently pat the site dry. You may apply a band aid daily for 2 days if desired.    · Inspect the site at least twice daily.  · Increase your fluid intake for the next 2 days.    · Limit your activity for the first 48 hours. .    · Avoid strenuous activity for 1 week or as advised by your physician.    · Follow instructions about when you can drive or return to work as directed by your physician.    · Hold direct pressure over the site when you cough, sneeze, laugh or change positions.  Do this for the next 2 days.     Call Your Doctor If:  · You have drainage (other than a small amount of blood on the dressing).  · You have chills or a fever > 101.  · You have redness, warmth, swelling(larger than a walnut), or pain at the insertion site  · .You have heavy bleeding from the site. If this happens, hold pressure on the site and call 911.  · You develop chest pain or shortness of breath, feel faint, or pass out.  · You develop pain, discoloration, coldness, numbness, tingling, or severe bruising in the leg that held the catheter.  · You develop bleeding from any other place, such as the bowels.    Make Sure You:  · Understand these instructions.  · Will watch your condition.  · Will get help right away if you are not doing well or get worse.

## 2017-04-26 LAB — ACT BLD: 260 SECONDS (ref 82–152)

## 2017-05-03 ENCOUNTER — OFFICE VISIT (OUTPATIENT)
Dept: CARDIOLOGY | Facility: CLINIC | Age: 62
End: 2017-05-03

## 2017-05-03 VITALS
DIASTOLIC BLOOD PRESSURE: 94 MMHG | OXYGEN SATURATION: 99 % | WEIGHT: 207 LBS | HEART RATE: 81 BPM | SYSTOLIC BLOOD PRESSURE: 148 MMHG | BODY MASS INDEX: 30.66 KG/M2 | HEIGHT: 69 IN

## 2017-05-03 DIAGNOSIS — I10 ESSENTIAL HYPERTENSION: ICD-10-CM

## 2017-05-03 DIAGNOSIS — I73.9 PAD (PERIPHERAL ARTERY DISEASE) (HCC): ICD-10-CM

## 2017-05-03 DIAGNOSIS — Z98.62 S/P PERIPHERAL ARTERY ANGIOPLASTY: Primary | ICD-10-CM

## 2017-05-03 PROCEDURE — 93000 ELECTROCARDIOGRAM COMPLETE: CPT | Performed by: PHYSICIAN ASSISTANT

## 2017-05-03 PROCEDURE — 99213 OFFICE O/P EST LOW 20 MIN: CPT | Performed by: PHYSICIAN ASSISTANT

## 2017-05-03 RX ORDER — AMLODIPINE BESYLATE AND BENAZEPRIL HYDROCHLORIDE 5; 10 MG/1; MG/1
1 CAPSULE ORAL DAILY
COMMUNITY
Start: 2017-05-02 | End: 2018-09-12 | Stop reason: ALTCHOICE

## 2017-05-16 ENCOUNTER — TELEPHONE (OUTPATIENT)
Dept: CARDIOLOGY | Facility: CLINIC | Age: 62
End: 2017-05-16

## 2017-05-24 ENCOUNTER — OFFICE VISIT (OUTPATIENT)
Dept: CARDIOLOGY | Facility: CLINIC | Age: 62
End: 2017-05-24

## 2017-05-24 VITALS
DIASTOLIC BLOOD PRESSURE: 84 MMHG | SYSTOLIC BLOOD PRESSURE: 148 MMHG | BODY MASS INDEX: 31.1 KG/M2 | HEART RATE: 73 BPM | WEIGHT: 210 LBS | HEIGHT: 69 IN

## 2017-05-24 DIAGNOSIS — M79.672 ACUTE FOOT PAIN, LEFT: ICD-10-CM

## 2017-05-24 DIAGNOSIS — I73.9 PAD (PERIPHERAL ARTERY DISEASE) (HCC): Primary | ICD-10-CM

## 2017-05-24 PROCEDURE — 99213 OFFICE O/P EST LOW 20 MIN: CPT | Performed by: INTERNAL MEDICINE

## 2017-05-24 PROCEDURE — 93000 ELECTROCARDIOGRAM COMPLETE: CPT | Performed by: INTERNAL MEDICINE

## 2017-05-24 RX ORDER — GABAPENTIN 300 MG/1
300 CAPSULE ORAL 2 TIMES DAILY
Qty: 60 CAPSULE | Refills: 11 | Status: SHIPPED | OUTPATIENT
Start: 2017-05-24 | End: 2018-03-02

## 2017-05-24 RX ORDER — GABAPENTIN 300 MG/1
300 CAPSULE ORAL 2 TIMES DAILY
Qty: 60 CAPSULE | Refills: 11 | Status: SHIPPED | OUTPATIENT
Start: 2017-05-24 | End: 2017-05-24 | Stop reason: SDUPTHER

## 2017-07-19 ENCOUNTER — OFFICE VISIT (OUTPATIENT)
Dept: CARDIOLOGY | Facility: CLINIC | Age: 62
End: 2017-07-19

## 2017-07-19 VITALS
HEIGHT: 69 IN | WEIGHT: 210 LBS | HEART RATE: 76 BPM | SYSTOLIC BLOOD PRESSURE: 162 MMHG | BODY MASS INDEX: 31.1 KG/M2 | DIASTOLIC BLOOD PRESSURE: 86 MMHG

## 2017-07-19 DIAGNOSIS — I73.9 PAD (PERIPHERAL ARTERY DISEASE) (HCC): Primary | ICD-10-CM

## 2017-07-19 PROCEDURE — 99213 OFFICE O/P EST LOW 20 MIN: CPT | Performed by: INTERNAL MEDICINE

## 2017-08-08 NOTE — PROGRESS NOTES
Subjective:     Encounter Date:07/19/2017      Patient ID: Loyd Vasquez is a 61 y.o. male.    Chief Complaint: PAD, leg pain    History of Present Illness    Dear Dr. Jimenez,      I had the pleasure of seeing this patient in cardiovascular follow up today.  As you well know, he is a 61-year-old gentleman with history of severe peripheral arterial disease.  I found him to have high grade bilateral iliac artery stenosis and occlusion of his left iliac and his left SFA.  I performed an extensive revascularization including stenting of his left SFA and left iliac artery on one procedure, and then subsequently I stented his occluded right SFA in a second procedure.      He has excellent perfusion, but complains of tightness in his left leg.  This is not a claudication type symptom, but more of a neuropathy.  He is seeing a podiatrist regarding this.  His claudication seems to have improved significantly.               Review of Systems   All other systems reviewed and are negative.      Procedures       Objective:     Physical Exam   Constitutional: He is oriented to person, place, and time. He appears well-developed and well-nourished.   HENT:   Head: Normocephalic and atraumatic.   Neck: Normal range of motion. Neck supple.   Cardiovascular: Normal rate, regular rhythm and normal heart sounds.    Pulmonary/Chest: Effort normal and breath sounds normal.   Abdominal: Soft. Bowel sounds are normal.   Musculoskeletal: Normal range of motion.   Neurological: He is alert and oriented to person, place, and time.   Skin: Skin is warm and dry.   Psychiatric: He has a normal mood and affect. His behavior is normal. Thought content normal.   Vitals reviewed.      Lab Review:       Assessment:          Diagnosis Plan   1. PAD (peripheral artery disease)            Plan:       It was a pleasure to see your patient in vascular follow up today.  He is status post complex, multi-level revascularization of both legs.  I think his  arterial perfusion at this time is excellent. He continues to complain of some pain, predominantly tightness in his foot. He is working with Podiatry regarding this. At this point I have encouraged him to continue to walk.  He will see me again in six months.

## 2017-12-19 ENCOUNTER — TELEPHONE (OUTPATIENT)
Dept: CARDIOLOGY | Facility: CLINIC | Age: 62
End: 2017-12-19

## 2018-03-02 ENCOUNTER — OFFICE VISIT (OUTPATIENT)
Dept: CARDIOLOGY | Facility: CLINIC | Age: 63
End: 2018-03-02

## 2018-03-02 VITALS
WEIGHT: 220 LBS | HEART RATE: 68 BPM | HEIGHT: 69 IN | OXYGEN SATURATION: 99 % | SYSTOLIC BLOOD PRESSURE: 160 MMHG | DIASTOLIC BLOOD PRESSURE: 90 MMHG | BODY MASS INDEX: 32.58 KG/M2

## 2018-03-02 DIAGNOSIS — I73.9 PAD (PERIPHERAL ARTERY DISEASE) (HCC): Primary | ICD-10-CM

## 2018-03-02 PROCEDURE — 99213 OFFICE O/P EST LOW 20 MIN: CPT | Performed by: PHYSICIAN ASSISTANT

## 2018-03-02 PROCEDURE — 93000 ELECTROCARDIOGRAM COMPLETE: CPT | Performed by: PHYSICIAN ASSISTANT

## 2018-03-02 NOTE — PROGRESS NOTES
Date of Office Visit: 2018  Encounter Provider: ZAHRA Dowell  Place of Service: [facility] CARDIOLOGY  Patient Name: Loyd Vasquez  :1955    Chief Complaint   Patient presents with   • Coronary Artery Disease     6 month follow up   :     HPI: Loyd Vasquez is a 62 y.o. male who presents today for Follow-up.  Old records have been obtained and reviewed by me.  He is a patient of Dr. Roland's who we treat for peripheral arterial disease.  He has had percutaneous interventions on both his right and his left leg.  These have both been complex and multilevel revascularizations.  He was last in our office to see Dr. Roland on 2017.  At that visit, he was doing well without complaints of angina or heart failure.  He had no claudication.  He was having some pain in his foot, however he was seeing a podiatrist for this.  He is here today for 6 month follow-up.   Over the past 6 months he's been doing fairly well.  He has no complaints of angina or heart failure.  His main complaint remains his left foot.  Whenever he stands or puts pressure on his left foot he has pain.  When he is not weightbearing his foot feels fine.  He also has complaints of what sounds like claudication in his left calf.  He states that after his procedure it was much better, however it started to get worse again.  He can walk a city block before he has to stop and rest to relieve his left calf pain.  Then he can start walking again to do fine.  He denies any chest pain, shortness of breath, palpitations, edema, dizziness, or syncope.      Past Medical History:   Diagnosis Date   • Chronic kidney disease     KIDNEY STONE   • Claudication    • Hyperlipidemia    • Hypertension    • PAD (peripheral artery disease)        Past Surgical History:   Procedure Laterality Date   • CARDIAC CATHETERIZATION N/A 3/14/2017    Procedure: Stent BMS peripheral;  Surgeon: Andrade Roland MD;  Location: [facility]  LOCATION;  Service:    • CARDIAC CATHETERIZATION N/A 4/25/2017    Procedure: Stent HARMEET peripheral;  Surgeon: Andrade Roland MD;  Location: [facility] CATH INVASIVE LOCATION;  Service:    • CORONARY ANGIOPLASTY     • CORONARY STENT PLACEMENT     • ILIAC VEIN ANGIOPLASTY / STENTING     • INTERVENTIONAL RADIOLOGY PROCEDURE N/A 3/14/2017    Procedure: Abdominal Aortagram with Runoff - L LE claudication;  Surgeon: Andrade Roland MD;  Location: [facility] CATH INVASIVE LOCATION;  Service:    • INTERVENTIONAL RADIOLOGY PROCEDURE N/A 4/25/2017    Procedure: PTA of right SFA;  Surgeon: Andrade Roland MD;  Location: [facility] CATH INVASIVE LOCATION;  Service:        Social History     Social History   • Marital status:      Spouse name: N/A   • Number of children: N/A   • Years of education: N/A     Occupational History   • Not on file.     Social History Main Topics   • Smoking status: Former Smoker     Packs/day: 1.00     Years: 45.00     Quit date: 3/15/2017   • Smokeless tobacco: Never Used   • Alcohol use No   • Drug use: No   • Sexual activity: Defer     Other Topics Concern   • Not on file     Social History Narrative       Family History   Problem Relation Age of Onset   • No Known Problems Mother    • No Known Problems Father    • No Known Problems Maternal Grandmother    • No Known Problems Maternal Grandfather    • No Known Problems Paternal Grandmother    • No Known Problems Paternal Grandfather        Review of Systems   Constitution: Negative for chills, fever, malaise/fatigue, weight gain and weight loss.   HENT: Negative for ear pain, hearing loss, nosebleeds and sore throat.    Eyes: Negative for double vision, pain and visual disturbance.   Cardiovascular: Positive for claudication. Negative for chest pain, dyspnea on exertion, irregular heartbeat, leg swelling, near-syncope, orthopnea, palpitations, paroxysmal nocturnal dyspnea and syncope.   Respiratory: Negative for cough, shortness of breath, sleep disturbances due to  "breathing, snoring and wheezing.    Endocrine: Negative for cold intolerance, heat intolerance and polyuria.   Skin: Negative for itching and rash.   Musculoskeletal: Positive for joint pain. Negative for joint swelling and myalgias.   Gastrointestinal: Negative for abdominal pain, diarrhea, melena, nausea and vomiting.   Genitourinary: Negative for frequency, hematuria and hesitancy.   Neurological: Negative for excessive daytime sleepiness, headaches, light-headedness, numbness, paresthesias and seizures.   Psychiatric/Behavioral: Negative for altered mental status and depression.   Allergic/Immunologic: Negative.    All other systems reviewed and are negative.      No Known Allergies      Current Outpatient Prescriptions:   •  amLODIPine-benazepril (LOTREL 5-10) 5-10 MG per capsule, Take 1 capsule by mouth Daily., Disp: , Rfl:   •  clopidogrel (PLAVIX) 75 MG tablet, Take 75 mg by mouth Daily., Disp: , Rfl:   •  rosuvastatin (CRESTOR) 5 MG tablet, Take 5 mg by mouth Daily., Disp: , Rfl:      Objective:     Vitals:    03/02/18 0922 03/02/18 0938   BP: 162/88 160/90   BP Location: Right arm Left arm   Pulse: 68    SpO2: 99%    Weight: 99.8 kg (220 lb)    Height: 175.3 cm (69\")      Body mass index is 32.49 kg/(m^2).    PHYSICAL EXAM:    Physical Exam   Constitutional: He is oriented to person, place, and time. He appears well-developed and well-nourished. No distress.   HENT:   Head: Normocephalic and atraumatic.   Eyes: Pupils are equal, round, and reactive to light.   Neck: No JVD present. No thyromegaly present.   Cardiovascular: Normal rate, regular rhythm, normal heart sounds and intact distal pulses.    No murmur heard.  Pulses:       Femoral pulses are 2+ on the right side, and 2+ on the left side.       Dorsalis pedis pulses are 1+ on the right side   Left dorsalis pedis and posterior tibialis pulses nonpalpable, auscultated with Doppler ultrasound.  They are diminished on the left when compared to the right " when auscultated with Doppler.   Pulmonary/Chest: Effort normal and breath sounds normal. No respiratory distress.   Abdominal: Soft. Bowel sounds are normal. He exhibits no distension. There is no splenomegaly or hepatomegaly. There is no tenderness.   Musculoskeletal: Normal range of motion. He exhibits no edema.   Neurological: He is alert and oriented to person, place, and time.   Skin: Skin is warm and dry. He is not diaphoretic. No erythema.   Psychiatric: He has a normal mood and affect. His behavior is normal. Judgment normal.         ECG 12 Lead  Date/Time: 3/2/2018 9:43 AM  Performed by: TAYO ONEIL.  Authorized by: TAYO ONEIL   Comparison: compared with previous ECG from 5/24/2017  Similar to previous ECG  Rhythm: sinus rhythm  Ectopy: PVCs  BPM: 68  Conduction: non-specific intraventricular conduction delay  T depression: III  Q waves: III and aVF  Clinical impression: abnormal ECG  Comments: Indication: Peripheral arterial disease.              Assessment:       Diagnosis Plan   1. PAD (peripheral artery disease)  ECG 12 Lead     Orders Placed This Encounter   Procedures   • ECG 12 Lead     This order was created via procedure documentation          Plan:       Overall he's stable from a cardiac standpoint.  He really has 2 different complaints of pain in his left leg.  He has pain in his foot that only occurs with weightbearing and is relieved when his foot is elevated.  The second complaint is what sounds like claudication in his left calf.  He feels like it starting to get worse, and that he did have relief after his last procedure.  He does have diminished pulses on the left when compared to the right even with Doppler.  I'm going to discuss this with Dr. Roland for further recommendations.  Otherwise he will follow-up with us in 6 months or sooner if needed.    As always, it has been a pleasure to participate in your patient's care.      Sincerely,         Tayo Oneil PA-C

## 2018-03-05 ENCOUNTER — TELEPHONE (OUTPATIENT)
Dept: CARDIOLOGY | Facility: CLINIC | Age: 63
End: 2018-03-05

## 2018-03-05 NOTE — TELEPHONE ENCOUNTER
I did discuss the plan of care with Dr. Roland.  If the patient is having claudication symptoms and would like to have a procedure done, Dr. Roland would recommend another intervention.  I left a message for him to call me back.

## 2018-04-20 RX ORDER — CLOPIDOGREL BISULFATE 75 MG/1
TABLET ORAL
Qty: 90 TABLET | Refills: 3 | Status: SHIPPED | OUTPATIENT
Start: 2018-04-20 | End: 2019-03-12 | Stop reason: SDUPTHER

## 2018-09-12 ENCOUNTER — OFFICE VISIT (OUTPATIENT)
Dept: CARDIOLOGY | Facility: CLINIC | Age: 63
End: 2018-09-12

## 2018-09-12 VITALS
WEIGHT: 219 LBS | SYSTOLIC BLOOD PRESSURE: 142 MMHG | HEIGHT: 69 IN | DIASTOLIC BLOOD PRESSURE: 80 MMHG | BODY MASS INDEX: 32.44 KG/M2 | HEART RATE: 67 BPM

## 2018-09-12 DIAGNOSIS — I73.9 CLAUDICATION (HCC): ICD-10-CM

## 2018-09-12 DIAGNOSIS — I25.10 CORONARY ARTERY DISEASE INVOLVING NATIVE CORONARY ARTERY OF NATIVE HEART WITHOUT ANGINA PECTORIS: ICD-10-CM

## 2018-09-12 DIAGNOSIS — I73.9 PAD (PERIPHERAL ARTERY DISEASE) (HCC): Primary | ICD-10-CM

## 2018-09-12 PROCEDURE — 93000 ELECTROCARDIOGRAM COMPLETE: CPT | Performed by: INTERNAL MEDICINE

## 2018-09-12 PROCEDURE — 99213 OFFICE O/P EST LOW 20 MIN: CPT | Performed by: INTERNAL MEDICINE

## 2018-09-12 RX ORDER — ASCORBIC ACID
1000 CRYSTALS ORAL DAILY
COMMUNITY
End: 2019-03-12

## 2018-09-12 NOTE — PROGRESS NOTES
Subjective:     Encounter Date:09/12/2018      Patient ID: Loyd Vasquez is a 62 y.o. male.    Chief Complaint: PAD, claudication, CAD    History of Present Illness    Dear Dr. Jimenez:    I had the pleasure of seeing this patient in cardiovascular followup today.  As you well know, he is a jw 62-year-old man with history of peripheral arterial disease.  He has had bilateral multilevel revascularization.    He comes in for his 6 month followup.  Since I have last seen him, he reports having intermittent left-sided claudication.  He has no angina.  He says that his claudication is somewhat limiting, but not bad enough that he wants to do anything about it currently.  He has no symptoms of critical limb ischemia.        Review of Systems   All other systems reviewed and are negative.        ECG 12 Lead  Date/Time: 9/12/2018 8:56 AM  Performed by: BELTRAN SHAHID  Authorized by: BELTRAN SHAHID   Comparison: compared with previous ECG   Similar to previous ECG  Rhythm: sinus rhythm  BPM: 67  Clinical impression: normal ECG               Objective:     Physical Exam   Constitutional: He is oriented to person, place, and time. He appears well-developed and well-nourished.   HENT:   Head: Normocephalic and atraumatic.   Neck: Normal range of motion. Neck supple.   Cardiovascular: Normal rate, regular rhythm and normal heart sounds.    Pulmonary/Chest: Effort normal and breath sounds normal.   Abdominal: Soft. Bowel sounds are normal.   Musculoskeletal: Normal range of motion.   Neurological: He is alert and oriented to person, place, and time.   Skin: Skin is warm and dry.   Psychiatric: He has a normal mood and affect. His behavior is normal. Thought content normal.   Vitals reviewed.      Lab Review:       Assessment:          Diagnosis Plan   1. PAD (peripheral artery disease) (CMS/HCC)     2. Claudication (CMS/[facility])     3. Coronary artery disease involving native coronary artery of native heart without angina pectoris             Plan:       It was a pleasure to see your patient in cardiac followup today.  He is stable from the coronary standpoint without any complaints of angina.  He has peripheral arterial disease with mild to moderate claudication, but no symptoms of critical limb ischemia.  I talked to him about possible repeat lower extremity angiography, but he would like to hold off for now.  He will see me again in 6 months.    Coronary Artery Disease  Assessment  • The patient has no angina    Plan  • Lifestyle modifications discussed include adhering to a heart healthy diet, avoidance of tobacco products, maintenance of a healthy weight, medication compliance, regular exercise and regular monitoring of cholesterol and blood pressure    Subjective - Objective  • There has been a previous stent procedure using BMS  • Current antiplatelet therapy includes aspirin 81 mg and clopidogrel 75 mg

## 2019-03-07 PROBLEM — E78.1 HYPERTRIGLYCERIDEMIA: Status: ACTIVE | Noted: 2019-03-07

## 2019-03-07 PROBLEM — I70.90 ARTERIOSCLEROTIC VASCULAR DISEASE: Status: ACTIVE | Noted: 2018-06-06

## 2019-03-07 PROBLEM — N40.0 BENIGN PROSTATIC HYPERPLASIA: Status: ACTIVE | Noted: 2018-06-06

## 2019-03-07 PROBLEM — M19.90 OSTEOARTHRITIS: Status: ACTIVE | Noted: 2018-06-06

## 2019-03-07 PROBLEM — E78.5 DYSLIPIDEMIA: Status: ACTIVE | Noted: 2018-06-06

## 2019-03-07 PROBLEM — G62.9 NEUROPATHY: Status: ACTIVE | Noted: 2018-06-06

## 2019-03-07 PROBLEM — E55.9 VITAMIN D DEFICIENCY: Status: ACTIVE | Noted: 2018-06-06

## 2019-03-07 PROBLEM — E66.9 OBESITY: Status: ACTIVE | Noted: 2018-09-24

## 2019-03-07 PROBLEM — E53.8 DISORDER OF VITAMIN B12: Status: ACTIVE | Noted: 2018-06-06

## 2019-03-07 NOTE — PROGRESS NOTES
Date of Office Visit: 2019  Encounter Provider: JENNY Donohue  Place of Service: [facility] CARDIOLOGY  Patient Name: Loyd Vasquez  :1955      Subjective:     Chief Complaint:  6-month follow-up for peripheral arterial disease    History of Present Illness:  Loyd Vasquez is a pleasant 63 y.o. male who is new to me.  Outside records have been obtained and reviewed by me.     This is a patient of Dr. Roland's that he follows for peripheral arterial disease.  He has had bilateral multilevel revascularization interventions.  He was last in the office on 18 for a 6-month follow-up with Dr. Roland.  He was reporting intermittent left-sided claudication that was somewhat limiting but not bad enough that he wanted to do anything about it currently.  He had no angina.  He had no symptoms of critical limb ischemia.  Dr. Roland talked to him about possible repeat lower extremity angiography but he wanted to hold off for now.  Dr. Roland wanted to see him back in 6 months.  Patient's medical regimen includes aspirin, Plavix and Crestor.      Looks like his last labs were in 2018 with Dr. Jimenez.  CBC was normal CMP showed elevated glucose of 116 but normal kidney and liver function, was normal, but panel showed elevated total cholesterol 207, elevated triglycerides of 403, HDL low at 27, no LDL was able to be performed, Hgb A1c little elevated at 5.8%, TSH normal.    He is here today for a 6 month f/u visit.  Overall he has been doing okay.  He denies  recurrence of left-sided claudication.  He does report some numbness of left toes that is been pretty constant for the last couple years.  He denies any chest pain, shortness of breath, palpitations, swelling of legs, dizziness, lightheadedness, syncope, near syncope.  His blood pressure is elevated today at 164/92.  He denies headache or blurred vision.  He reports at home his blood pressures typically run 130s systolic.  He reports he  "has not taken his blood pressure medication yet today.  Fortunately he is no longer smoking.  He reports that he had labs done in January 2019 by his PCP was he was told her cholesterol levels were \"good.\"  I do not see that.  The last labs I see were from June.  I will request the records to see when his last labs were.       Past Medical History:   Diagnosis Date   • Chronic kidney disease     KIDNEY STONE   • Claudication (CMS/[facility])    • Coronary artery disease    • Hyperlipidemia    • Hypertension    • PAD (peripheral artery disease) (CMS/[facility])      Past Surgical History:   Procedure Laterality Date   • CARDIAC CATHETERIZATION N/A 3/14/2017    Procedure: Stent BMS peripheral;  Surgeon: Andrade Roland MD;  Location: [facility] CATH INVASIVE LOCATION;  Service:    • CARDIAC CATHETERIZATION N/A 4/25/2017    Procedure: Stent HARMEET peripheral;  Surgeon: Andrade Roland MD;  Location: [facility] CATH INVASIVE LOCATION;  Service:    • CORONARY ANGIOPLASTY     • CORONARY STENT PLACEMENT     • ILIAC VEIN ANGIOPLASTY / STENTING     • INTERVENTIONAL RADIOLOGY PROCEDURE N/A 3/14/2017    Procedure: Abdominal Aortagram with Runoff - L LE claudication;  Surgeon: Andrade Roland MD;  Location: [facility] CATH INVASIVE LOCATION;  Service:    • INTERVENTIONAL RADIOLOGY PROCEDURE N/A 4/25/2017    Procedure: PTA of right SFA;  Surgeon: Andrade Roland MD;  Location: [facility] CATH INVASIVE LOCATION;  Service:      Outpatient Medications Prior to Visit   Medication Sig Dispense Refill   • amLODIPine-benazepril (LOTREL 5-10) 5-10 MG per capsule Take 1 capsule by mouth Daily.     • aspirin 81 MG tablet Take 81 mg by mouth Daily.     • clopidogrel (PLAVIX) 75 MG tablet TAKE 1 TABLET DAILY 90 tablet 3   • rosuvastatin (CRESTOR) 5 MG tablet Take 5 mg by mouth Daily.     • Cyanocobalamin (VITAMIN B 12) 250 MCG lozenge Take 1,000 mg by mouth Daily.       No facility-administered medications prior to visit.        Allergies as of 03/12/2019   • (No Known Allergies)     Social " History     Socioeconomic History   • Marital status:      Spouse name: Not on file   • Number of children: Not on file   • Years of education: Not on file   • Highest education level: Not on file   Social Needs   • Financial resource strain: Not on file   • Food insecurity - worry: Not on file   • Food insecurity - inability: Not on file   • Transportation needs - medical: Not on file   • Transportation needs - non-medical: Not on file   Occupational History   • Not on file   Tobacco Use   • Smoking status: Former Smoker     Packs/day: 1.00     Years: 45.00     Pack years: 45.00     Last attempt to quit: 3/15/2017     Years since quittin.9   • Smokeless tobacco: Never Used   Substance and Sexual Activity   • Alcohol use: No   • Drug use: No   • Sexual activity: Defer   Other Topics Concern   • Not on file   Social History Narrative   • Not on file     Family History   Problem Relation Age of Onset   • No Known Problems Mother    • No Known Problems Father    • No Known Problems Maternal Grandmother    • No Known Problems Maternal Grandfather    • No Known Problems Paternal Grandmother    • No Known Problems Paternal Grandfather      Review of Systems   Constitution: Negative for chills, fever and malaise/fatigue.   HENT: Negative for ear pain, hearing loss, nosebleeds and sore throat.    Eyes: Negative for double vision, pain, vision loss in left eye and vision loss in right eye.   Cardiovascular: Positive for claudication. Negative for chest pain, dyspnea on exertion, irregular heartbeat, leg swelling, near-syncope, orthopnea, palpitations, paroxysmal nocturnal dyspnea and syncope.   Respiratory: Negative for cough, shortness of breath, snoring and wheezing.    Endocrine: Negative for cold intolerance and heat intolerance.   Hematologic/Lymphatic: Negative for bleeding problem.   Skin: Negative for color change, itching, rash and unusual hair distribution.   Musculoskeletal: Negative for joint pain and  "joint swelling.   Gastrointestinal: Negative for abdominal pain, diarrhea, hematochezia, melena, nausea and vomiting.   Genitourinary: Negative for decreased libido, frequency, hematuria, hesitancy and incomplete emptying.   Neurological: Negative for excessive daytime sleepiness, dizziness, headaches, light-headedness, loss of balance, numbness, paresthesias and seizures.   Psychiatric/Behavioral: Negative for depression.          Objective:     Vitals:    03/12/19 1022   BP: 164/92   Pulse: 71   Resp: 20   Weight: 102 kg (225 lb)   Height: 172.7 cm (68\")     Body mass index is 34.21 kg/m².    PHYSICAL EXAM:  Physical Exam   Constitutional: He is oriented to person, place, and time. He appears well-developed and well-nourished. No distress.   Obese   HENT:   Head: Normocephalic and atraumatic.   Eyes: Pupils are equal, round, and reactive to light.   Neck: No JVD present. Carotid bruit is not present.   Cardiovascular: Normal rate, regular rhythm and normal heart sounds. Exam reveals decreased pulses.   No murmur heard.  Pulses:       Radial pulses are 2+ on the right side, and 2+ on the left side.        Popliteal pulses are 1+ on the left side.        Posterior tibial pulses are 1+ on the right side.   Diminished distal pulses.  Able to Doppler popliteal pulse on left and PT pulse on right.  Capillary refill less than 3 seconds on right.  Capillary refill greater than 3 seconds on left. No open areas. Patient able to feel palpation on exam.    Pulmonary/Chest: Effort normal and breath sounds normal. No accessory muscle usage. No tachypnea. No respiratory distress. He has no wheezes. He has no rhonchi. He has no rales. He exhibits no tenderness.   Abdominal: Soft. Bowel sounds are normal. He exhibits no distension. There is no tenderness. There is no rebound.   Obese abdomen   Musculoskeletal: Normal range of motion. He exhibits no edema.   Neurological: He is alert and oriented to person, place, and time.   Skin: " Skin is warm, dry and intact. He is not diaphoretic. No erythema.   Psychiatric: He has a normal mood and affect. His speech is normal and behavior is normal. Judgment and thought content normal. Cognition and memory are normal.   Nursing note and vitals reviewed.        ECG 12 Lead  Date/Time: 3/12/2019 10:25 AM  Performed by: Kaley Ramirez APRN  Authorized by: Kaley Ramirez APRN   Comparison: compared with previous ECG from 9/12/2018  Similar to previous ECG  Rhythm: sinus rhythm  Rate: normal  BPM: 71  Conduction: non-specific intraventricular conduction delay  Q waves: III    T flattening: aVL  QRS axis: normal  Other findings: low voltage    Clinical impression: abnormal EKG  Comments: Indication: PAD, HTN,              4/25/17 Peripheral angiography with intervention (most recent procedure):      INDICATION FOR PROCEDURE: PAD, claudication     PROCEDURE PERFORMED: PTA/stenting of the right SFA     PROCEDURE COMMENTS:      After informed consent was obtained, the patient was prepped and draped in the usual manner.  Lidocaine was infused in the left groin for anesthesia.  Using fluoroscopic guidance I was able to obtain micropuncture access in the left common femoral artery between the 2 stents placed.  Angiography through the micro-sheath confirmed good positioning for the arterial puncture.  A 6 Yi sheath was inserted.     A tennis racquet catheter was directed to the aortic bifurcation.  A Glidewire was directed through this catheter to the right SFA.  Over this I inserted a glide catheter.  Through this I exchanged for a supra core wire and then was able to remove the sheath and advanced a 6 Yi [place] destination sheath to the right SFA.     Angiography through the sheath demonstrated a remnant of the SFA.  The SFA was occluded from the proximal to the mid vessel.  It reconstituted just before the adductor canal via profunda collaterals.     Heparin was used for anticoagulation.  I used a  glide catheter and Glidewire to cross the proximal cap.  At the distal I was unable to advance any further.  I exchanged for a quick cross catheter.  Through this I was unable to cross the distal cap.  I punctured the distal cap using the stiff end of a Glidewire.  I was then able to cross into the true lumen using a  200 wire.  The quick cross catheter was directed distally.  Distal injection confirmed intraluminal positioning.  I then inserted a supra core wire.  Over this I dilated the lesion using a 5.0 x 150 mm [place] balloon.  Additional predilation was performed using a 6.0 x 150 mm [place] balloon.  Angiography demonstrated good restoration of flow but a long area of dissection.  I stented the distal aspect of the lesion using a 7.0 x 150 mm Protegé stent.  This was postdilated using the 6.0 mm balloon.  I stented more proximally to the ostium of the SFA using a 7.0 x 120 mm Protegé and again postdilated using the 6 mm balloon.     Final angiography demonstrated no residual stenosis with brisk flow and no dissection.     I pullback across the previously seen iliac lesion but found a gradient.  Because of this I decided that the iliac did not need to be treated at this time.     The patient tolerated the procedure without complication.  A total of 85 cc of contrast and 13.1 minutes of fluoroscopy time were used.  The Air KERMA was 0.09 gray.     SUMMARY: Successful stenting of the R SFA occlusion was performed as described above.     RECOMMENDATIONS: Continue anti-platelet therapy indefinitely.    3/14/17 Peripheral angiography:  FINDINGS:     1. ABDOMINAL AORTA: The abdominal aorta is calcified with mild diffuse atherosclerosis.  The renal arteries were single bilaterally with mild disease.  The aortic bifurcation was calcified with 80% focal stenosis of the right common iliac artery.  The left common iliac artery had diffuse 40% stenosis.  The internal iliac arteries were severely diffusely diseased on  the right and totally occluded on the left.  The external iliac artery had diffuse 90% stenosis on the left and 40% stenosis on the right.     2. RIGHT LOWER EXTREMITY: The common femoral artery had mild stenosis.  The SFA was totally occluded at the ostium.  The profunda artery had mild diffuse disease.  The SFA reconstituted at mid vessel via profunda collaterals.  There was three-vessel runoff to the foot.     3. LEFT LOWER EXTREMITY: The common femoral artery had mild disease.  The profunda had a high origin around the mid point of the femoral head.  There is 70% ostial stenosis of this vessel.  The SFA was totally occluded proximally.  There were profunda collaterals to the mid SFA.  The popliteal artery had mild diffuse disease.  There was three-vessel runoff to the foot.     4. INTERVENTIONAL COMMENTS:  Successful PTA/stenting of the left external iliac artery was performed as described above.  Successful PTA/stenting from the distal left common femoral artery down to the distal SFA was performed as described above.        SUMMARY: Severe left iliac and SFA disease successfully treated with stenting.     RECOMMENDATIONS: Dual antiplatelet therapy for at least one year.  Consider return for right SFA intervention in the future if symptomatic.          Assessment:       Diagnosis Plan   1. PAD (peripheral artery disease) (CMS/[facility])     2. Arteriosclerotic vascular disease     3. S/P peripheral artery angioplasty     4. Essential hypertension     5. Dyslipidemia     6. Hypertriglyceridemia     7. Tobacco abuse     8. Class 1 obesity with body mass index (BMI) of 32.0 to 32.9 in adult, unspecified obesity type, unspecified whether serious comorbidity present         Plan:     1. Peripheral arterial disease: Status post bilateral complex multilevel revascularization interventions. PTA/stenting of the right SFA 4/25/17, PTA/stenting of the distal left common femoral artery to the distal left SFA, PTA/stenting of the  left external iliac artery on 3/14/17. On aspirin, plavix and Crestor. Decreased sensation of left toes (unchanged for about 2 years). Denies any claudication. Will continue to monitor for worsening symptoms. Asked that he try to increase walking daily. Diminished distal pulses left > right. No evidence of critical limb ischemia at this time.     2. Hypertension: On amlodipine 5 mg-benazepril 10 mg daily. B/P high today at 164/92. He has not taken his blood pressure medication yet.  I advised patient to start monitoring his blood pressure at home more often to ensure he does not need medication titration.  He reports normally his blood pressure runs 130s systolic at home typically.     3. Hyperlipidemia/hypertriglyceridemia: On Crestor 5 mg.  I reiterated diet and exercise with the patient. Last labs I see were from June 2018. Reports he has labs in January 2019. Will request to see. I advised him that his last lipid panel was uncontrolled and he may need increase in medication by Dr. Jimenez.     4. Obesity: I have asked that he try to increase his walking daily.     I advised on the importance of good blood pressure, blood sugar and cholesterol control, as well as regular exercise and weight loss and avoidance of tobacco for cardiovascular and vascular disease risk factor modification.           Follow up with Dr. Vu for transition of PAD care in 6 months, unless otherwise needed sooner.  I advised the patient to contact our office with any questions or concerns.         Your medication list           Accurate as of 3/12/19 10:37 AM. If you have any questions, ask your nurse or doctor.               CONTINUE taking these medications      Instructions Last Dose Given Next Dose Due   amLODIPine-benazepril 5-10 MG per capsule  Commonly known as:  LOTREL 5-10      Take 1 capsule by mouth Daily.       aspirin 81 MG tablet      Take 81 mg by mouth Daily.       clopidogrel 75 MG tablet  Commonly known as:  PLAVIX       TAKE 1 TABLET DAILY       rosuvastatin 5 MG tablet  Commonly known as:  CRESTOR      Take 5 mg by mouth Daily.              The above medication changes may not have been made by this provider.  Medication list was updated to reflect medications patient is currently taking including medication changes and discontinuations made by other healthcare providers.       I have reviewed this case with Dr. Roland. No indication for further testing/treatment at this time. It has been a pleasure to participate in this patient's care. Please feel free to contact me with any questions or concerns.     Kaley Ramirez, APRN  03/12/2019       Dictated utilizing Dragon Dictation System.

## 2019-03-12 ENCOUNTER — OFFICE VISIT (OUTPATIENT)
Dept: CARDIOLOGY | Facility: CLINIC | Age: 64
End: 2019-03-12

## 2019-03-12 VITALS
BODY MASS INDEX: 34.1 KG/M2 | HEART RATE: 71 BPM | RESPIRATION RATE: 20 BRPM | WEIGHT: 225 LBS | SYSTOLIC BLOOD PRESSURE: 164 MMHG | DIASTOLIC BLOOD PRESSURE: 92 MMHG | HEIGHT: 68 IN

## 2019-03-12 DIAGNOSIS — E66.9 CLASS 1 OBESITY WITH BODY MASS INDEX (BMI) OF 32.0 TO 32.9 IN ADULT, UNSPECIFIED OBESITY TYPE, UNSPECIFIED WHETHER SERIOUS COMORBIDITY PRESENT: ICD-10-CM

## 2019-03-12 DIAGNOSIS — I73.9 PAD (PERIPHERAL ARTERY DISEASE) (HCC): Primary | ICD-10-CM

## 2019-03-12 DIAGNOSIS — I10 ESSENTIAL HYPERTENSION: ICD-10-CM

## 2019-03-12 DIAGNOSIS — E78.1 HYPERTRIGLYCERIDEMIA: ICD-10-CM

## 2019-03-12 DIAGNOSIS — E78.5 DYSLIPIDEMIA: ICD-10-CM

## 2019-03-12 DIAGNOSIS — Z98.62 S/P PERIPHERAL ARTERY ANGIOPLASTY: ICD-10-CM

## 2019-03-12 DIAGNOSIS — Z72.0 TOBACCO ABUSE: ICD-10-CM

## 2019-03-12 DIAGNOSIS — I70.90 ARTERIOSCLEROTIC VASCULAR DISEASE: ICD-10-CM

## 2019-03-12 PROCEDURE — 93000 ELECTROCARDIOGRAM COMPLETE: CPT | Performed by: NURSE PRACTITIONER

## 2019-03-12 PROCEDURE — 99214 OFFICE O/P EST MOD 30 MIN: CPT | Performed by: NURSE PRACTITIONER

## 2019-03-12 RX ORDER — CLOPIDOGREL BISULFATE 75 MG/1
75 TABLET ORAL DAILY
Qty: 90 TABLET | Refills: 1 | Status: SHIPPED | OUTPATIENT
Start: 2019-03-12

## 2019-03-12 RX ORDER — AMLODIPINE BESYLATE AND BENAZEPRIL HYDROCHLORIDE 5; 10 MG/1; MG/1
1 CAPSULE ORAL DAILY
COMMUNITY

## 2024-03-16 DIAGNOSIS — I65.23 BILATERAL CAROTID ARTERY STENOSIS: Primary | ICD-10-CM

## 2024-03-16 DIAGNOSIS — I73.9 PERIPHERAL VASCULAR DISEASE: ICD-10-CM

## 2024-08-20 PROBLEM — I73.9 CLAUDICATION: Status: ACTIVE | Noted: 2024-08-20

## (undated) DEVICE — GW EMR FIX EXCHG J STD .035 3MM 260CM

## (undated) DEVICE — DEV INDEFLATOR

## (undated) DEVICE — RADIFOCUS GLIDEWIRE: Brand: GLIDEWIRE

## (undated) DEVICE — ARMADA 35 LL PTA CATHETER 5 MM X 150 MM X 135 CM / OVER-THE-WIRE: Brand: ARMADA

## (undated) DEVICE — PK CATH CARD 40

## (undated) DEVICE — QUICK-CROSS™ SUPPORT CATHETER: Brand: QUICK-CROSS™

## (undated) DEVICE — HI-TORQUE SUPRA CORE .035 PERIPHERAL GUIDE WIRE .035 X 190 CM: Brand: HI-TORQUE SUPRA CORE

## (undated) DEVICE — CATH TENNIS RACQUET .035  5FR 65CM

## (undated) DEVICE — Device

## (undated) DEVICE — GW HITORQUE/BAL MID/WT J W/HCOAT .014 3X190CM

## (undated) DEVICE — RADIFOCUS GLIDECATH: Brand: GLIDECATH

## (undated) DEVICE — KT MANIFLD CARDIAC

## (undated) DEVICE — HI-TORQUE SUPRA CORE .035 PERIPHERAL GUIDE WIRE .035 X 300 CM: Brand: HI-TORQUE SUPRA CORE

## (undated) DEVICE — ARMADA 35 LL PTA CATHETER 6 MM X 200 MM X 135 CM / OVER-THE-WIRE: Brand: ARMADA

## (undated) DEVICE — HI-TORQUE PILOT 200 GUIDE WIRE .014 STRAIGHT TIP 3.0 CM X 190 CM: Brand: HI-TORQUE PILOT

## (undated) DEVICE — INTRO SHEATH ART/FEM ENGAGE .035 6F12CM

## (undated) DEVICE — DESTINATION RENAL GUIDING SHEATH: Brand: DESTINATION

## (undated) DEVICE — ARMADA 35 LL PTA CATHETER 6 MM X 150 MM X 135 CM / OVER-THE-WIRE: Brand: ARMADA

## (undated) DEVICE — ARMADA 35 PTA CATHETER 7 MM X 100 MM X 135 CM / OVER-THE-WIRE: Brand: ARMADA